# Patient Record
Sex: MALE | Race: WHITE | URBAN - METROPOLITAN AREA
[De-identification: names, ages, dates, MRNs, and addresses within clinical notes are randomized per-mention and may not be internally consistent; named-entity substitution may affect disease eponyms.]

---

## 2022-09-21 ENCOUNTER — APPOINTMENT (RX ONLY)
Dept: URBAN - METROPOLITAN AREA CLINIC 56 | Facility: CLINIC | Age: 57
Setting detail: DERMATOLOGY
End: 2022-09-21

## 2022-09-21 DIAGNOSIS — L71.8 OTHER ROSACEA: ICD-10-CM | Status: INADEQUATELY CONTROLLED

## 2022-09-21 DIAGNOSIS — Z71.89 OTHER SPECIFIED COUNSELING: ICD-10-CM

## 2022-09-21 PROCEDURE — ? TREATMENT REGIMEN

## 2022-09-21 PROCEDURE — 99203 OFFICE O/P NEW LOW 30 MIN: CPT

## 2022-09-21 PROCEDURE — ? COUNSELING

## 2022-09-21 PROCEDURE — ? SUNSCREEN RECOMMENDATIONS

## 2022-09-21 PROCEDURE — ? PRESCRIPTION

## 2022-09-21 RX ORDER — DOXYCYCLINE 100 MG/1
CAPSULE ORAL
Qty: 60 | Refills: 5 | Status: ERX | COMMUNITY
Start: 2022-09-21

## 2022-09-21 RX ORDER — METRONIDAZOLE 7.5 MG/G
CREAM TOPICAL
Qty: 45 | Refills: 5 | Status: ERX | COMMUNITY
Start: 2022-09-21

## 2022-09-21 RX ADMIN — DOXYCYCLINE: 100 CAPSULE ORAL at 00:00

## 2022-09-21 RX ADMIN — METRONIDAZOLE: 7.5 CREAM TOPICAL at 00:00

## 2022-09-21 ASSESSMENT — LOCATION SIMPLE DESCRIPTION DERM
LOCATION SIMPLE: LEFT CHEEK
LOCATION SIMPLE: NOSE
LOCATION SIMPLE: LEFT FOREHEAD
LOCATION SIMPLE: RIGHT CHEEK

## 2022-09-21 ASSESSMENT — LOCATION ZONE DERM
LOCATION ZONE: NOSE
LOCATION ZONE: FACE

## 2022-09-21 ASSESSMENT — LOCATION DETAILED DESCRIPTION DERM
LOCATION DETAILED: RIGHT CENTRAL MALAR CHEEK
LOCATION DETAILED: LEFT MEDIAL FOREHEAD
LOCATION DETAILED: NASAL DORSUM
LOCATION DETAILED: LEFT CENTRAL MALAR CHEEK

## 2022-09-21 NOTE — HPI: OTHER
Condition:: Rosacea
Please Describe Your Condition:: Patient is referred by Dr. Stover for treatment of rosacea.  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  Patient got very nice results.  However, he is struggling with inflammatory papules and redness of the face.  He has never been compliant taking pills regularly.  But when he does take doxycycline, his face definitely improves.  He tolerates the medication as well.  He also has metronidazole 0.75% cream.  He is pretty good about applying it daily but has never applied it twice daily.

## 2022-09-21 NOTE — PROCEDURE: TREATMENT REGIMEN
Plan: \\n1.  Increase metronidazole 0.75% cream to twice daily rather than daily.\\n2.  Restart doxycycline 100 mg p.o. twice daily with food and water.  Once the inflammatory papular component of his disease has settled down, he may decrease the doxycycline to 100 mg daily.  Potential side effects of doxycycline reviewed in detail such as nausea, vomiting, reflux, photosensitivity, headaches, diarrhea, and drug rashes.
Detail Level: Zone

## 2023-03-14 ENCOUNTER — APPOINTMENT (RX ONLY)
Dept: URBAN - METROPOLITAN AREA CLINIC 56 | Facility: CLINIC | Age: 58
Setting detail: DERMATOLOGY
End: 2023-03-14

## 2023-03-14 DIAGNOSIS — L71.8 OTHER ROSACEA: ICD-10-CM | Status: WELL CONTROLLED

## 2023-03-14 PROCEDURE — 99213 OFFICE O/P EST LOW 20 MIN: CPT

## 2023-03-14 PROCEDURE — ? TREATMENT REGIMEN

## 2023-03-14 NOTE — PROCEDURE: TREATMENT REGIMEN
Plan: \\n1.  Encouraged patient to use the metronidazole 0.75% cream twice daily to his face rather than daily.  I recommended he place the medication by his toothbrush so that he will remember to use it.\\n2.  Continue doxycycline 100 mg p.o. daily with food and water.  In 1 month after he has been using his metronidazole cream twice daily regularly, he may attempt to discontinue the doxycycline.  He may restart the medication if he notes flaring of the disease despite twice daily use of the metronidazole cream.\\n3.  Consider a trial of Plexion lotion if he is unable to wean off the doxycycline..
Detail Level: Detailed